# Patient Record
Sex: FEMALE | Race: BLACK OR AFRICAN AMERICAN | NOT HISPANIC OR LATINO | Employment: FULL TIME | URBAN - METROPOLITAN AREA
[De-identification: names, ages, dates, MRNs, and addresses within clinical notes are randomized per-mention and may not be internally consistent; named-entity substitution may affect disease eponyms.]

---

## 2017-09-28 ENCOUNTER — OFFICE VISIT (OUTPATIENT)
Dept: ORTHOPEDIC SURGERY | Facility: CLINIC | Age: 52
End: 2017-09-28

## 2017-09-28 VITALS — WEIGHT: 220.4 LBS | BODY MASS INDEX: 35.42 KG/M2 | HEIGHT: 66 IN | TEMPERATURE: 97.6 F

## 2017-09-28 DIAGNOSIS — M25.561 CHRONIC PAIN OF BOTH KNEES: ICD-10-CM

## 2017-09-28 DIAGNOSIS — M17.12 PRIMARY OSTEOARTHRITIS OF LEFT KNEE: ICD-10-CM

## 2017-09-28 DIAGNOSIS — M25.562 CHRONIC PAIN OF BOTH KNEES: ICD-10-CM

## 2017-09-28 DIAGNOSIS — M17.11 PRIMARY OSTEOARTHRITIS OF RIGHT KNEE: ICD-10-CM

## 2017-09-28 DIAGNOSIS — G89.29 CHRONIC PAIN OF BOTH KNEES: ICD-10-CM

## 2017-09-28 DIAGNOSIS — M25.562 CHRONIC PAIN OF LEFT KNEE: Primary | ICD-10-CM

## 2017-09-28 DIAGNOSIS — G89.29 CHRONIC PAIN OF LEFT KNEE: Primary | ICD-10-CM

## 2017-09-28 PROCEDURE — 99214 OFFICE O/P EST MOD 30 MIN: CPT | Performed by: ORTHOPAEDIC SURGERY

## 2017-09-28 PROCEDURE — 73564 X-RAY EXAM KNEE 4 OR MORE: CPT | Performed by: ORTHOPAEDIC SURGERY

## 2017-09-28 RX ORDER — TIOTROPIUM BROMIDE INHALATION SPRAY 1.56 UG/1
SPRAY, METERED RESPIRATORY (INHALATION)
COMMUNITY
Start: 2017-07-12

## 2017-09-28 RX ORDER — CARVEDILOL 6.25 MG/1
TABLET ORAL
COMMUNITY
Start: 2017-08-28

## 2017-09-28 RX ORDER — TRAMADOL HYDROCHLORIDE 50 MG/1
TABLET ORAL
COMMUNITY
Start: 2017-08-30 | End: 2020-05-26

## 2017-09-28 RX ORDER — WARFARIN SODIUM 2.5 MG/1
TABLET ORAL
COMMUNITY
Start: 2017-06-26 | End: 2020-11-20

## 2017-09-28 RX ORDER — ERGOCALCIFEROL 1.25 MG/1
CAPSULE ORAL
COMMUNITY
Start: 2017-07-10

## 2017-09-28 NOTE — PROGRESS NOTES
Patient:  Morena Contreras is a 51 y.o. female    Chief Complaint/ Reason for Visit:    Chief Complaint   Patient presents with   • Left Knee - Establish Care, Pain   • Right Knee - Establish Care, Pain       HPI:  The patient presents today complaining primarily of left knee pain.  The pain is aching in character, occasionally sharp and stabbing usually mild but occasionally moderate in intensity.  She'll occasionally have a burning sensation on the anterior aspect of the left knee.  She also has episodic mild anterior pain in her right knee to a lesser degree.  She has not had a recent injury, but she does say that she slipped and fell back in May and may have injured the right knee then.  Standing and walking exacerbate her knee pain.  Rest and elevation help.  She has occasionally taken anti-inflammatories such as Aleve, Advil, aspirin, or meloxicam.  Lately, however, she's been on anticoagulants due to her heart surgery and has been instructed to avoid anti-inflammatories accordingly.    The patient has had a cortisone shot in her right knee that I performed in July 2016.  That did give her long-lasting relief.    As noted above, she has had other things going on in her medical status of more importance.  She had open-heart surgery to repair 2 of her heart valves back on June 13.  She is still recovering from that.      PMH:    Past Medical History:   Diagnosis Date   • Anemia    • Asthma    • Cardiac disease     valve lean       PSH:    Past Surgical History:   Procedure Laterality Date   • CARDIAC SURGERY  06/2017   • TUBAL ABDOMINAL LIGATION         Social Hx:    Social History     Social History   • Marital status: Other     Spouse name: N/A   • Number of children: N/A   • Years of education: N/A     Occupational History   • Not on file.     Social History Main Topics   • Smoking status: Never Smoker   • Smokeless tobacco: Never Used   • Alcohol use No   • Drug use: No   • Sexual activity: Defer     Other  Topics Concern   • Not on file     Social History Narrative       Family Hx:    Family History   Problem Relation Age of Onset   • Heart disease Other      valve leak       Meds:    Current Outpatient Prescriptions:   •  carvedilol (COREG) 6.25 MG tablet, , Disp: , Rfl:   •  FLOVENT DISKUS 250 MCG/BLIST aerosol powder , , Disp: , Rfl:   •  SPIRIVA RESPIMAT 1.25 MCG/ACT aerosol solution inhaler, , Disp: , Rfl:   •  traMADol (ULTRAM) 50 MG tablet, , Disp: , Rfl:   •  budesonide-formoterol (SYMBICORT) 160-4.5 MCG/ACT inhaler, Inhale 2 puffs 2 (two) times a day., Disp: , Rfl:   •  meloxicam (MOBIC) 15 MG tablet, , Disp: , Rfl:   •  montelukast (SINGULAIR) 10 MG tablet, Take 10 mg by mouth every night., Disp: , Rfl:   •  Pantoprazole Sodium (PROTONIX PO), Take 40 mg by mouth., Disp: , Rfl:   •  VIBERZI 75 MG tablet, , Disp: , Rfl:   •  vitamin D (ERGOCALCIFEROL) 21168 units capsule capsule, , Disp: , Rfl:   •  warfarin (COUMADIN) 2.5 MG tablet, , Disp: , Rfl:   •  WELCHOL 625 MG tablet, , Disp: , Rfl:     Allergies:    Allergies   Allergen Reactions   • Procaine      Seizure like activity       ROS:  Review of Systems   Constitutional: Positive for unexpected weight change. Negative for chills and fever.   HENT: Positive for postnasal drip. Negative for trouble swallowing and voice change.    Eyes: Positive for visual disturbance. Eye itching: dry.   Respiratory: Positive for cough and wheezing. Negative for shortness of breath.    Cardiovascular: Negative for chest pain and leg swelling.   Gastrointestinal: Positive for diarrhea. Negative for abdominal pain, nausea and vomiting.   Endocrine: Negative for cold intolerance and heat intolerance.   Genitourinary: Negative for difficulty urinating, frequency and urgency.   Musculoskeletal: Positive for joint swelling.   Skin: Negative for rash and wound.   Allergic/Immunologic: Negative for immunocompromised state.   Neurological: Positive for headaches. Negative for  "weakness and numbness.   Hematological: Does not bruise/bleed easily.   Psychiatric/Behavioral: Positive for sleep disturbance. Negative for dysphoric mood. The patient is not nervous/anxious.        Vitals:    09/28/17 1151   Temp: 97.6 °F (36.4 °C)   TempSrc: Temporal Artery    Weight: 220 lb 6.4 oz (100 kg)   Height: 66\" (167.6 cm)   Body mass index is 35.57 kg/(m^2).      Physical Exam    The patient is awake, alert, and oriented ×3.  The patient is in no acute distress.  Breathing is regular and unlabored with a respiratory rate of 12/m.  Extraocular movements and pupillary responses are symmetrically intact. Sclerae are anicteric.   Hearing is within normal limits.  Speech is within normal limits.  There is no jugular venous distention.    On standing exam, the patient's lower extremities are fairly symmetrical in appearance.  There is no muscular atrophy.  The patient is fairly heavyset and this can be seen well down into both lower extremities.  She has a lumbering gait with no pronounced limp.    Left knee: There is no abnormal warmth, bruising, or discoloration.  Range of motion is 0-120° of flexion.  She does have mild crepitus on patella grind.  The left knee feels stable at this time.  The patient has some anterolateral tenderness along the patellofemoral articulation.  Apprehension test is negative.  The left knee feels stable in all planes.  Bounce test is negative.  Kyleigh test is negative.  Hyperflexion test is positive.    Right knee: There is no abnormal warmth, bruising, or discoloration.  Range of motion is 0-125° of flexion.  The patient has moderate crepitus on patella grind.  The right knee feels stable at this time.  The patient has no tenderness though she does have pain on patella grind.  Apprehension test is negative.  Bounce test is negative.  Kyleigh test is negative.        Radiology:X-rays: A 4 view arthritis series of each knee was ordered and reviewed today to assess patient's " complaints of bilateral knee pain.  I did review these personally and I did compare them to images we had here in the office dating as far back as 2012.  Today's images reveal the patient has moderate tricompartmental degenerative change in the right knee in the form of tricompartmental osteophyte formation.  Joint line narrowing.  The patient has what appears to be a stable loose body in the posterior aspect of the right knee.  Spurring on the patella is noted.  In comparison to the previous images, the degenerative changes and spurring in the patellofemoral compartment of the right knee in particular have worsened and the loose body in the back has enlarged.  The left knee images today reveal mild to moderate degenerative change and is most notable in the patellofemoral compartment.  These changes have not worsened substantially since the previous images we had here in the office for comparison.  I see no evidence of acute fracture on any the images today.          Assessment:  1. Chronic pain of left knee    - Ambulatory Referral to Physical Therapy Evaluate and treat, Ortho    2. Chronic pain of both knees    - XR Knee 4+ View Bilateral  - Ambulatory Referral to Physical Therapy Evaluate and treat, Ortho    3. Primary osteoarthritis of right knee    - Ambulatory Referral to Physical Therapy Evaluate and treat, Ortho    4. Primary osteoarthritis of left knee    - Ambulatory Referral to Physical Therapy Evaluate and treat, Ortho          Plan:  I discussed everything with the patient length.  Due to the shape and adiposity of her legs, I do not think bracing is likely to be very useful.  I think physical therapy is important.  I think that the acute pain in the left knee may be benefited by a topical anti-inflammatory/analgesic preparation.    Prescription medication management: I did provide the patient with instructions regarding the use of and a prescription for the topical transcutaneous anti-inflammatory  analgesic preparation.  Appropriate precautions were discussed as well.  The patient voiced understanding.    Physical therapy was prescribed.    We discussed injections, but she does not feel that that is warranted, and I feel that it is contraindicated given the fact that she is currently on anticoagulants.      Orders Placed This Encounter   Procedures   • XR Knee 4+ View Bilateral     Order Specific Question:   Reason for Exam:     Answer:   óscar knee pain     Order Specific Question:   Patient Pregnant     Answer:   No   • Ambulatory Referral to Physical Therapy Evaluate and treat, Ortho     Referral Priority:   Routine     Referral Type:   Therapy     Referral Reason:   Specialty Services Required     Requested Specialty:   Physical Therapy     Number of Visits Requested:   1

## 2017-10-11 ENCOUNTER — TREATMENT (OUTPATIENT)
Dept: PHYSICAL THERAPY | Facility: CLINIC | Age: 52
End: 2017-10-11

## 2017-10-11 DIAGNOSIS — G89.29 CHRONIC PAIN OF BOTH KNEES: ICD-10-CM

## 2017-10-11 DIAGNOSIS — M25.562 CHRONIC PAIN OF BOTH KNEES: ICD-10-CM

## 2017-10-11 DIAGNOSIS — M17.0 PRIMARY OSTEOARTHRITIS OF BOTH KNEES: Primary | ICD-10-CM

## 2017-10-11 DIAGNOSIS — M25.561 CHRONIC PAIN OF BOTH KNEES: ICD-10-CM

## 2017-10-11 PROCEDURE — 97161 PT EVAL LOW COMPLEX 20 MIN: CPT | Performed by: PHYSICAL THERAPIST

## 2017-10-11 PROCEDURE — 97530 THERAPEUTIC ACTIVITIES: CPT | Performed by: PHYSICAL THERAPIST

## 2017-10-11 PROCEDURE — 97110 THERAPEUTIC EXERCISES: CPT | Performed by: PHYSICAL THERAPIST

## 2017-10-11 NOTE — PROGRESS NOTES
"Physical Therapy Initial Evaluation and Plan of Care    TIME IN 1508 TIME OUT 1616  Patient: Morena Contreras   : 1965  Diagnosis/ICD-10 Code:  Primary osteoarthritis of both knees [M17.0]  Referring practitioner: Charles Elmore MD    Subjective Evaluation    History of Present Illness  Onset date: 6-7+ years.  Mechanism of injury: (R) knee \"pops all the time when I stand up now.\" Both knees painful on/off but (L) knee more painful.  Reports \"tore cartilage\" (R) knee getting up off couch but did not have surgery. \"Air seat\" bounces and irritates knees.      Had some PT for (B) knees 5-6 years ago.      PMH: Open heart surgery for valve leak repair 17.  Was doing cardiac rehab in  2017 and developed a large (L) LE hematoma from taking blood thinners.  Knee pain worsened after this so patient presented to Dr. Elmore for assessment who recommended PT for (B) knee OA.   Sees Dr. Timo Chatman for (B) foot tendonitis.      Per patient, (R) knee OA is more advanced than (L) knee but (L) knee pain is worse.          Patient Occupation:  - [employer] [facility]  Current pain ratin  At best pain ratin  Pain scale at highest: (L) knee: 9/10; (R) knee: 3/10 (intermittent)  Location: (B) knees at anterior and lateral knee along joint line  Quality: dull ache  Relieving factors: heat, relaxation and rest (warm towels)  Progression: worsening    Social Support  Lives in: multiple-level home (3 steps inside house and 12 basement steps)  Lives with: spouse    Hand dominance: right    Diagnostic Tests  X-ray: abnormal (mild to moderate osteoarthritis (B) knees)    Treatments  Previous treatment: physical therapy and injection treatment (most recent injection (L) knee approx. 3 yrs ago)  Current treatment: physical therapy  Patient Goals  Patient goals for therapy: decreased pain and increased strength  Patient goal: \"Help with pain and strengthen my legs\"           Objective       Tenderness "     Additional Tenderness Details  Min (+) TTP (B) anteromedial tibial plateau, pes anserine insertion    Active Range of Motion   Left Knee   Flexion: 123 degrees   Extension: 0 degrees   Extensor la degrees     Right Knee   Flexion: 126 degrees   Extension: 0 degrees   Extensor la degrees     Strength/Myotome Testing     Left Hip   Planes of Motion   Flexion: 4  Abduction: 3+  Adduction: 4-    Right Hip   Planes of Motion   Flexion: 4  Abduction: 3+  Adduction: 4-    Left Knee   Flexion: 4+  Extension: 4+  Quadriceps contraction: fair    Right Knee   Flexion: 4+  Extension: 4+  Quadriceps contraction: fair    Left Ankle/Foot   Dorsiflexion: 4+    Right Ankle/Foot   Dorsiflexion: 4+    Swelling     Left Knee Girth Measurement (cm)   Joint line: 49.5 cm.    Right Knee Girth Measurement (cm)   Joint line: 49.7 cm.         Assessment & Plan     Assessment  Impairments: abnormal gait, activity intolerance, impaired balance, impaired physical strength, lacks appropriate home exercise program and pain with function  Other impairment: limited ADLs, impaired functional mobility, and decreased QOL  Assessment details: STGs: to be met in 3 weeks  1. Patient will be independent with initial HEP  2. Patient will report improved (L) knee s/s 0-4/10 and (R) knee s/s 0-2/10  3. Patient will demonstrate (B) knee AROM WFL, painfree  4. Patient will be nontender to palpation (B) knees  5. Patient will tolerate sitting x 2 hours and standing/walking x 45 minutes     LTGs: to be met in 6 weeks  1. Patient will be independent with progressed strength and balance HEP  2. Patient will report improved (B) knee s/s 0-1/10  3. Patient will demonstrate improved (B) LE strength >/= 5-/5, grossly  4. Patient will perform 10 minutes of static and dynamic balance activities without significant LOB episode  5. Patient will report 75% improved community distance functional mobility  6. Patient will exhibit improved LEFS score >/=  50/80  Prognosis: good  Functional Limitations: walking, uncomfortable because of pain, sitting, standing and stooping  Plan  Therapy options: will be seen for skilled physical therapy services  Planned modality interventions: cryotherapy, electrical stimulation/Russian stimulation, thermotherapy (hydrocollator packs) and ultrasound  Planned therapy interventions: balance/weight-bearing training, flexibility, functional ROM exercises, gait training, home exercise program, manual therapy, joint mobilization, neuromuscular re-education, soft tissue mobilization, strengthening, stretching and therapeutic activities  Frequency: 2-3 x wk.  Duration in weeks: 6  Treatment plan discussed with: patient        Manual Therapy:         mins  29255;  Therapeutic Exercise:    25     mins  11838;     Neuromuscular Laine:        mins  12105;    Therapeutic Activity:     16     mins  02781;     Gait Training:           mins  36170;     Ultrasound:          mins  07506;    Electrical Stimulation:         mins  01571 ( );  Dry Needling          mins self-pay    Timed Treatment:   41   mins   Total Treatment:     66   mins    PT SIGNATURE: Jasmin Ladd PT, DPT   DATE TREATMENT INITIATED: 10/11/2017    Initial Certification  Certification Period: 1/9/2018  I certify that the therapy services are furnished while this patient is under my care.  The services outlined above are required by this patient, and will be reviewed every 90 days.     PHYSICIAN: Charles Elmore MD      DATE:     Please sign and return via fax to [phone].. Thank you, [facility].

## 2017-10-17 ENCOUNTER — TREATMENT (OUTPATIENT)
Dept: PHYSICAL THERAPY | Facility: CLINIC | Age: 52
End: 2017-10-17

## 2017-10-17 DIAGNOSIS — M25.561 CHRONIC PAIN OF BOTH KNEES: ICD-10-CM

## 2017-10-17 DIAGNOSIS — G89.29 CHRONIC PAIN OF BOTH KNEES: ICD-10-CM

## 2017-10-17 DIAGNOSIS — M17.0 PRIMARY OSTEOARTHRITIS OF BOTH KNEES: Primary | ICD-10-CM

## 2017-10-17 DIAGNOSIS — M25.562 CHRONIC PAIN OF BOTH KNEES: ICD-10-CM

## 2017-10-17 PROCEDURE — 97530 THERAPEUTIC ACTIVITIES: CPT | Performed by: PHYSICAL THERAPIST

## 2017-10-17 PROCEDURE — 97110 THERAPEUTIC EXERCISES: CPT | Performed by: PHYSICAL THERAPIST

## 2017-10-17 NOTE — PROGRESS NOTES
"Physical Therapy Daily Progress Note    Time In 1054  Time Out 1136    Morena Contreras reports: \"I'm doing alright I guess. My knees are about the same.  My ankles started to bother me a bit at [employer] the other day so I don't know what's going on. I had trouble getting up into the back of my SUV the other day because my legs are so weak.\"    Subjective     Objective   See Exercise, Manual, and Modality Logs for complete treatment.   *Initiated LE bike  *Added standing weightshifts, heel raises, SAQ and hamstring curls  *Increased most LE strengthening repetitions to 12    Assessment & Plan     Assessment  Assessment details: Patient tolerates all exercise and activity progressions well this date.  She requires tactile cueing to more fully activate and contract quadriceps muscle (L) > (R) LE with fair return.  She demonstrates apparent moderate difficulty performing transitions, especially sit to stand from low surface.  Issued printed HEP with exercise additions.  All questions are answered to patient's satisfaction.          Progress strengthening /stabilization /functional activity. Progress to green hamstring curls and consider addition of step activity.         Manual Therapy:         mins  05033;  Therapeutic Exercise:    28     mins  53403;     Neuromuscular Laine:        mins  85150;    Therapeutic Activity:     14     mins  81226;     Gait Training:           mins  00761;     Ultrasound:          mins  62902;    Electrical Stimulation:         mins  63708 ( );  Dry Needling          mins self-pay    Timed Treatment:   42   mins   Total Treatment:     42   mins    Jasmin Ladd PT, DPT  Physical Therapist  [place] License # [id number]    "

## 2017-10-19 ENCOUNTER — TREATMENT (OUTPATIENT)
Dept: PHYSICAL THERAPY | Facility: CLINIC | Age: 52
End: 2017-10-19

## 2017-10-19 DIAGNOSIS — M25.561 CHRONIC PAIN OF BOTH KNEES: ICD-10-CM

## 2017-10-19 DIAGNOSIS — G89.29 CHRONIC PAIN OF BOTH KNEES: ICD-10-CM

## 2017-10-19 DIAGNOSIS — M25.562 CHRONIC PAIN OF BOTH KNEES: ICD-10-CM

## 2017-10-19 DIAGNOSIS — M17.0 PRIMARY OSTEOARTHRITIS OF BOTH KNEES: Primary | ICD-10-CM

## 2017-10-19 PROCEDURE — 97110 THERAPEUTIC EXERCISES: CPT | Performed by: PHYSICAL THERAPIST

## 2017-10-19 PROCEDURE — 97530 THERAPEUTIC ACTIVITIES: CPT | Performed by: PHYSICAL THERAPIST

## 2017-10-19 NOTE — PROGRESS NOTES
"Physical Therapy Daily Progress Note    Time In 1104  Time Out 1151    Morena Contreras reports: \"My knees have been feeling pretty good.  I've been trying to make an effort to do my exercises a second time during the day.\"    Subjective     Objective   See Exercise, Manual, and Modality Logs for complete treatment.   *Added 4\" step activity (FW and LAT)  *Increased repetitions of most LE strengthening activities    Assessment & Plan     Assessment  Assessment details: Patient is able to progress LE bike time, repetitions of LE strengthening activities, and initiate 4\" step activity this date with c/o only minor increase in muscular fatigue.  She is motivated with all activity and strength progressions.  She exhibits an increased valgus (R) > (L) LE during stance phase of gait and most any weightbearing activity (i.e. Steps) and would benefit to progress hip strength and stabilization activities to effect decrease knee joint stresses.        Progress strengthening /stabilization /functional activity         Manual Therapy:         mins  91008;  Therapeutic Exercise:    28     mins  38836;     Neuromuscular Laine:        mins  86336;    Therapeutic Activity:     13     mins  98432;     Gait Training:           mins  95891;     Ultrasound:          mins  31168;    Electrical Stimulation:         mins  87758 ( );  Dry Needling          mins self-pay    Timed Treatment:   41   mins   Total Treatment:     47   mins    Jasmin Ladd PT, DPT  Physical Therapist  [place] License # [id number]    "

## 2017-10-24 ENCOUNTER — TREATMENT (OUTPATIENT)
Dept: PHYSICAL THERAPY | Facility: CLINIC | Age: 52
End: 2017-10-24

## 2017-10-24 DIAGNOSIS — M17.0 PRIMARY OSTEOARTHRITIS OF BOTH KNEES: Primary | ICD-10-CM

## 2017-10-24 DIAGNOSIS — G89.29 CHRONIC PAIN OF BOTH KNEES: ICD-10-CM

## 2017-10-24 DIAGNOSIS — M25.561 CHRONIC PAIN OF BOTH KNEES: ICD-10-CM

## 2017-10-24 DIAGNOSIS — M25.562 CHRONIC PAIN OF BOTH KNEES: ICD-10-CM

## 2017-10-24 PROCEDURE — 97110 THERAPEUTIC EXERCISES: CPT | Performed by: PHYSICAL THERAPIST

## 2017-10-24 PROCEDURE — 97530 THERAPEUTIC ACTIVITIES: CPT | Performed by: PHYSICAL THERAPIST

## 2017-10-24 NOTE — PROGRESS NOTES
"Physical Therapy Daily Progress Note    Time In 1102  Time Out 1155    Morena Contreras reports: \"My knees have been feeling pretty good.  My left foot has really been bothering me. I start my cardiac rehab tomorrow afternoon; I had the evaluation yesterday.\"    Subjective     Objective   See Exercise, Manual, and Modality Logs for complete treatment.   *Added SLR with quad set (B)    Assessment & Plan     Assessment  Assessment details: Patient reports slightly improving s/s (B) knees.  She ambulates with moderately increased valgus moment (B) LEs during stance and increased lateral trunk sway.  Two to three times during PT session, patient becomes apparently slightly winded and requires brief rest break for recovery before proceeding.  She struggles with initiation of SLR (R) > (L) LE, especially during eccentric component.        Progress strengthening /stabilization /functional activity. Reassess (B) knee AROM and strength.  Consider addition of resisted TKE.         Manual Therapy:         mins  45802;  Therapeutic Exercise:    39     mins  09364;     Neuromuscular Laine:        mins  19557;    Therapeutic Activity:     14     mins  70961;     Gait Training:           mins  57175;     Ultrasound:          mins  37056;    Electrical Stimulation:         mins  14715 ( );  Dry Needling          mins self-pay    Timed Treatment:   53   mins   Total Treatment:     53   mins    Jasmin Ladd PT, DPT  Physical Therapist  [place] License # [id number]    "

## 2017-10-31 ENCOUNTER — TREATMENT (OUTPATIENT)
Dept: PHYSICAL THERAPY | Facility: CLINIC | Age: 52
End: 2017-10-31

## 2017-10-31 DIAGNOSIS — M17.0 PRIMARY OSTEOARTHRITIS OF BOTH KNEES: Primary | ICD-10-CM

## 2017-10-31 DIAGNOSIS — M25.561 CHRONIC PAIN OF BOTH KNEES: ICD-10-CM

## 2017-10-31 DIAGNOSIS — M25.562 CHRONIC PAIN OF BOTH KNEES: ICD-10-CM

## 2017-10-31 DIAGNOSIS — G89.29 CHRONIC PAIN OF BOTH KNEES: ICD-10-CM

## 2017-10-31 PROCEDURE — 97530 THERAPEUTIC ACTIVITIES: CPT | Performed by: PHYSICAL THERAPIST

## 2017-10-31 PROCEDURE — 97110 THERAPEUTIC EXERCISES: CPT | Performed by: PHYSICAL THERAPIST

## 2017-10-31 NOTE — PROGRESS NOTES
"Physical Therapy Daily Progress Note    Time In 1059  Time Out 1152    Morena Contreras reports: \"I started my cardiac rehab and have been doing ok with it.  It wears me out a little bit until I get used to it again.  My left knee has been feeling ok but the right one is the one that gives me problems because of that bone spur and it wants to catch and give out on me at times. I feel that both of my legs are getting stronger though. I am trying to change my mindset where I actually start using my right legs to go up steps first sometimes but it's hard to do.\"    Subjective     Objective       Active Range of Motion   Left Knee   Flexion: 131 degrees   Extension: 0 degrees   Extensor la degrees     Right Knee   Flexion: 130 degrees   Extension: 0 degrees   Extensor la degrees     Strength/Myotome Testing     Left Knee   Left knee flexion strength: 5-/5.  Left knee extension strength: 5-/5.  Quadriceps contraction: good    Right Knee   Right knee flexion strength: 5-/5.  Right knee extension strength: 5-/5.  Quadriceps contraction: good     See Exercise, Manual, and Modality Logs for complete treatment.   *Increased repetitions with step-up activity and resistance with hip add isometrics  *Initiated bridging    Assessment & Plan     Assessment  Assessment details: Patient demonstrates improved (B) knee flexion AROM and knee strength.  She reports improved feeling of strength in (B) LEs but persistent deficits in (R) > (L) knee, especially regarding stair negotiation.  She demonstrates significant proximal > distal LE weakness, most evident in performance of SLR and addition of bridging this date. Moderate antalgia and compensation is evident with short distance community ambulation, (R) > (L) LE. She remains motivated with PT activities and progressions.        Progress strengthening /stabilization /functional activity         Manual Therapy:         mins  59475;  Therapeutic Exercise:    32     mins  17992;   "   Neuromuscular Laine:        mins  49091;    Therapeutic Activity:     14     mins  07278;     Gait Training:           mins  63315;     Ultrasound:          mins  30922;    Electrical Stimulation:         mins  02690 ( );  Dry Needling          mins self-pay    Timed Treatment:   46  mins   Total Treatment:     53   mins    Jasmin Ladd PT, DPT  Physical Therapist  [place] License # [id number]

## 2017-11-02 ENCOUNTER — TREATMENT (OUTPATIENT)
Dept: PHYSICAL THERAPY | Facility: CLINIC | Age: 52
End: 2017-11-02

## 2017-11-02 DIAGNOSIS — M17.0 PRIMARY OSTEOARTHRITIS OF BOTH KNEES: Primary | ICD-10-CM

## 2017-11-02 DIAGNOSIS — M25.561 CHRONIC PAIN OF BOTH KNEES: ICD-10-CM

## 2017-11-02 DIAGNOSIS — M25.562 CHRONIC PAIN OF BOTH KNEES: ICD-10-CM

## 2017-11-02 DIAGNOSIS — G89.29 CHRONIC PAIN OF BOTH KNEES: ICD-10-CM

## 2017-11-02 PROCEDURE — 97110 THERAPEUTIC EXERCISES: CPT | Performed by: PHYSICAL THERAPIST

## 2017-11-02 PROCEDURE — 97530 THERAPEUTIC ACTIVITIES: CPT | Performed by: PHYSICAL THERAPIST

## 2017-11-02 NOTE — PROGRESS NOTES
"Physical Therapy Daily Progress Note    Time In 1051  Time Out 1139    Morena Contreras reports: \"I'm dragging today.  I'm really tired and I don't know why. My knees feel alright but my left ankle and foot are killing me.  I think it's about time to schedule an appointment to get that looked into further.\"    Subjective     Objective   See Exercise, Manual, and Modality Logs for complete treatment.   *HELD standing heel raises this date due to (L) foot pain    Assessment & Plan     Assessment  Assessment details: Patient performs all activities and exercises with fair to good endurance and technique with minimal cueing to improve effectiveness.  She reports limiting (L) foot and ankle pain which seems to be worsening and affects her ability to perform standing heel raises this date as well as pain during performance of LE bike.  Encouraged patient's decision to pursue MD evaluation of her foot due to persistent and worsening s/s.        Progress strengthening /stabilization /functional activity. Consider addition of bridging for proximal/hip strengthening.         Manual Therapy:         mins  19650;  Therapeutic Exercise:    36     mins  52022;     Neuromuscular Laine:        mins  21864;    Therapeutic Activity:     12     mins  26626;     Gait Training:           mins  85943;     Ultrasound:          mins  84648;    Electrical Stimulation:         mins  76559 ( );  Dry Needling          mins self-pay    Timed Treatment:   48   mins   Total Treatment:     48   mins    Jasmin Ladd PT, DPT  Physical Therapist  [place] License # [id number]    "

## 2017-11-07 ENCOUNTER — TREATMENT (OUTPATIENT)
Dept: PHYSICAL THERAPY | Facility: CLINIC | Age: 52
End: 2017-11-07

## 2017-11-07 DIAGNOSIS — M17.0 PRIMARY OSTEOARTHRITIS OF BOTH KNEES: Primary | ICD-10-CM

## 2017-11-07 DIAGNOSIS — G89.29 CHRONIC PAIN OF BOTH KNEES: ICD-10-CM

## 2017-11-07 DIAGNOSIS — M25.562 CHRONIC PAIN OF BOTH KNEES: ICD-10-CM

## 2017-11-07 DIAGNOSIS — M25.561 CHRONIC PAIN OF BOTH KNEES: ICD-10-CM

## 2017-11-07 PROCEDURE — 97530 THERAPEUTIC ACTIVITIES: CPT | Performed by: PHYSICAL THERAPIST

## 2017-11-07 PROCEDURE — 97110 THERAPEUTIC EXERCISES: CPT | Performed by: PHYSICAL THERAPIST

## 2017-11-07 NOTE — PROGRESS NOTES
"Physical Therapy Daily Progress Note    Time In 1428  Time Out 1518    Morena Contreras reports: \"My knees have been doing really good.  I still haven't called the doctor about my foot [[facility]].\"    Subjective     Objective   See Exercise, Manual, and Modality Logs for complete treatment.   *Progressed to 6\" step    Assessment & Plan     Assessment  Assessment details: Patient is able to progress step height this date with minor c/o increased fatigue but no significant discomfort or compensatory pattern.  Overall she demonstrates much improved stability with short distance community ambulation including decreased compensatory lateral trunk sway and antalgia either LE.  She performs transitional movements including sit to stands with increased speed and apparent improved ease and comfort.        Progress strengthening /stabilization /functional activity         Manual Therapy:         mins  79698;  Therapeutic Exercise:    21     mins  72138;     Neuromuscular Laine:        mins  21096;    Therapeutic Activity:     14     mins  29935;     Gait Training:           mins  75403;     Ultrasound:          mins  61402;    Electrical Stimulation:         mins  89415 ( );  Dry Needling          mins self-pay    Timed Treatment:   35   mins   Total Treatment:     50   mins    Jasmin Ladd PT, DPT  Physical Therapist  [place] License # [id number]    "

## 2017-11-14 ENCOUNTER — OFFICE VISIT (OUTPATIENT)
Dept: PHYSICAL THERAPY | Facility: CLINIC | Age: 52
End: 2017-11-14

## 2017-11-14 DIAGNOSIS — M25.561 CHRONIC PAIN OF BOTH KNEES: ICD-10-CM

## 2017-11-14 DIAGNOSIS — M25.562 CHRONIC PAIN OF BOTH KNEES: ICD-10-CM

## 2017-11-14 DIAGNOSIS — M17.0 PRIMARY OSTEOARTHRITIS OF BOTH KNEES: Primary | ICD-10-CM

## 2017-11-14 DIAGNOSIS — G89.29 CHRONIC PAIN OF BOTH KNEES: ICD-10-CM

## 2017-11-14 PROCEDURE — 97530 THERAPEUTIC ACTIVITIES: CPT | Performed by: PHYSICAL THERAPIST

## 2017-11-14 PROCEDURE — 97110 THERAPEUTIC EXERCISES: CPT | Performed by: PHYSICAL THERAPIST

## 2017-11-15 NOTE — PROGRESS NOTES
Physical Therapy Daily Progress Note    Time In 1100  Time Out 1200    Morena Contreras reports  Knees still giving me some trouble.  A little harder to do exercises today as doctor says I may be retaining fluid around my heart.  He has started me back on a water pill.    Subjective     Objective   See Exercise, Manual, and Modality Logs for complete treatment.   Red band for resistive hip flexion marches.   Increased TKE to green band.      Assessment/Plan  .Compliant/Cooperative with rehab efforts.  Subjectively reports no increased symptoms or discomfort with therapeutic exercise today, though does require some extra breaks between exercises due to fatigue.  Able to perform increased exercise/functional activity with addition of resistive band to marches and increased resistive band color with TKE without increased knee pain.  Benefits from verbal/tactile cues to ensure proper exercise technique/performance in regards to reps/hold time.    Progress strengthening /stabilization /functional activity as able for affected musculature.           Manual Therapy:         mins  07256;  Therapeutic Exercise:    30     mins  94436;     Neuromuscular Laine:       mins  61213;    Therapeutic Activity:    15     mins  06402;     Gait Training:           mins  78831;     Ultrasound:         mins  18631;    Electrical Stimulation:      mins  45327 ( );      Timed Treatment:   45   mins   Total Treatment:     60   mins    Prabhjot Yu PTA    Physical Therapist Assistant [place] [id number]

## 2017-11-16 ENCOUNTER — TREATMENT (OUTPATIENT)
Dept: PHYSICAL THERAPY | Facility: CLINIC | Age: 52
End: 2017-11-16

## 2017-11-16 DIAGNOSIS — G89.29 CHRONIC PAIN OF BOTH KNEES: ICD-10-CM

## 2017-11-16 DIAGNOSIS — M17.0 PRIMARY OSTEOARTHRITIS OF BOTH KNEES: Primary | ICD-10-CM

## 2017-11-16 DIAGNOSIS — M25.561 CHRONIC PAIN OF BOTH KNEES: ICD-10-CM

## 2017-11-16 DIAGNOSIS — M25.562 CHRONIC PAIN OF BOTH KNEES: ICD-10-CM

## 2017-11-16 PROCEDURE — 97530 THERAPEUTIC ACTIVITIES: CPT | Performed by: PHYSICAL THERAPIST

## 2017-11-16 PROCEDURE — 97110 THERAPEUTIC EXERCISES: CPT | Performed by: PHYSICAL THERAPIST

## 2017-11-16 NOTE — PROGRESS NOTES
"Physical Therapy Daily Progress Note    Time In 1058  Time Out 1202    Morena Contreras reports: \"They put me back on a water pill to get all of this fluid off of me.  It's helping a lot but I'm running to the bathroom often.  I am getting better though. My knees are feeling good. I go to see the foot doctor tomorrow about my left foot.\"    Subjective     Objective   See Exercise, Manual, and Modality Logs for complete treatment.       Assessment & Plan     Assessment  Assessment details: Patient is limited in weightbearing activity progression at this time by (L) foot pain which she is addressing by going to foot specialist tomorrow.  Otherwise, she is able to gradually and steadily progress most LE strengthening activities with c/o only fatigue.  Discussed with patient her readiness to transition to once weekly appointments x 2 weeks as first step in facilitating increased independence and eventual readiness for discharge.  Patient is in agreement with this decision.        Other - decrease frequency to once weekly.         Manual Therapy:         mins  58156;  Therapeutic Exercise:    19     mins  77533;     Neuromuscular Laine:        mins  91438;    Therapeutic Activity:     14     mins  01855;     Gait Training:           mins  33311;     Ultrasound:          mins  45104;    Electrical Stimulation:         mins  70853 ( );  Dry Needling          mins self-pay    Timed Treatment:   33   mins   Total Treatment:     64   mins    Jasmin Ladd PT, DPT  Physical Therapist  [place] License # [id number]    "

## 2017-11-21 ENCOUNTER — TREATMENT (OUTPATIENT)
Dept: PHYSICAL THERAPY | Facility: CLINIC | Age: 52
End: 2017-11-21

## 2017-11-21 DIAGNOSIS — M17.0 PRIMARY OSTEOARTHRITIS OF BOTH KNEES: Primary | ICD-10-CM

## 2017-11-21 DIAGNOSIS — M25.562 CHRONIC PAIN OF BOTH KNEES: ICD-10-CM

## 2017-11-21 DIAGNOSIS — M25.561 CHRONIC PAIN OF BOTH KNEES: ICD-10-CM

## 2017-11-21 DIAGNOSIS — G89.29 CHRONIC PAIN OF BOTH KNEES: ICD-10-CM

## 2017-11-21 PROCEDURE — 97530 THERAPEUTIC ACTIVITIES: CPT | Performed by: PHYSICAL THERAPIST

## 2017-11-21 PROCEDURE — 97110 THERAPEUTIC EXERCISES: CPT | Performed by: PHYSICAL THERAPIST

## 2017-11-21 NOTE — PROGRESS NOTES
"Physical Therapy Daily Progress Note    Time In 1101  Time Out 1149    Morena Contreras reports: \"My knees have been doing well.  My main problem now is this left foot.  I saw the foot doctor last week and she said there is not a lot she can do since I am on blood thinners but that it is probably tendonitis and she gave me an order to start PT on it.\"    Subjective     Objective   See Exercise, Manual, and Modality Logs for complete treatment.   *Defer step activities this date due to (L) foot pain    Assessment & Plan     Assessment  Assessment details: Patient continues to report minimal (B) knee c/o and feeling of improved strength which has translated to improved functional mobility and activity tolerance.  She appears primarily limited at this time by (L) foot pain.  She is motivated to transition from PT for (B) knees to treating (L) foot in attempt to improve s/s and function.  Patient is to bring PT order from foot specialist next appointment.        Other - tentative plan to D/C skilled PT (B) knees and initiate PT for left foot.           Manual Therapy:         mins  22294;  Therapeutic Exercise:    16     mins  61213;     Neuromuscular Laine:        mins  28219;    Therapeutic Activity:     13     mins  42229;     Gait Training:           mins  04085;     Ultrasound:          mins  67252;    Electrical Stimulation:         mins  09060 ( );  Dry Needling          mins self-pay    Timed Treatment:   29   mins   Total Treatment:     48   mins    Jasmin Ladd PT, DPT  Physical Therapist  [place] License # [id number]    "

## 2017-11-28 ENCOUNTER — TREATMENT (OUTPATIENT)
Dept: PHYSICAL THERAPY | Facility: CLINIC | Age: 52
End: 2017-11-28

## 2017-11-28 DIAGNOSIS — M25.561 CHRONIC PAIN OF BOTH KNEES: ICD-10-CM

## 2017-11-28 DIAGNOSIS — M25.562 CHRONIC PAIN OF BOTH KNEES: ICD-10-CM

## 2017-11-28 DIAGNOSIS — M17.0 PRIMARY OSTEOARTHRITIS OF BOTH KNEES: Primary | ICD-10-CM

## 2017-11-28 DIAGNOSIS — G89.29 CHRONIC PAIN OF BOTH KNEES: ICD-10-CM

## 2017-11-28 PROCEDURE — 97032 APPL MODALITY 1+ESTIM EA 15: CPT | Performed by: PHYSICAL THERAPIST

## 2017-11-28 PROCEDURE — 97530 THERAPEUTIC ACTIVITIES: CPT | Performed by: PHYSICAL THERAPIST

## 2017-11-29 ENCOUNTER — TRANSCRIBE ORDERS (OUTPATIENT)
Dept: PHYSICAL THERAPY | Facility: CLINIC | Age: 52
End: 2017-11-29

## 2017-11-29 DIAGNOSIS — M76.822 TIBIALIS POSTERIOR TENDONITIS, LEFT: Primary | ICD-10-CM

## 2017-11-29 DIAGNOSIS — M76.72 PERONEAL TENDONITIS, LEFT: ICD-10-CM

## 2017-12-05 ENCOUNTER — TREATMENT (OUTPATIENT)
Dept: PHYSICAL THERAPY | Facility: CLINIC | Age: 52
End: 2017-12-05

## 2017-12-05 DIAGNOSIS — M76.822 POSTERIOR TIBIAL TENDONITIS, LEFT: ICD-10-CM

## 2017-12-05 DIAGNOSIS — M79.672 LEFT FOOT PAIN: Primary | ICD-10-CM

## 2017-12-05 DIAGNOSIS — M76.72 PERONEAL TENDONITIS, LEFT: ICD-10-CM

## 2017-12-05 PROCEDURE — 97530 THERAPEUTIC ACTIVITIES: CPT | Performed by: PHYSICAL THERAPIST

## 2017-12-05 PROCEDURE — 97161 PT EVAL LOW COMPLEX 20 MIN: CPT | Performed by: PHYSICAL THERAPIST

## 2017-12-05 PROCEDURE — 97110 THERAPEUTIC EXERCISES: CPT | Performed by: PHYSICAL THERAPIST

## 2017-12-05 PROCEDURE — 97014 ELECTRIC STIMULATION THERAPY: CPT | Performed by: PHYSICAL THERAPIST

## 2017-12-05 NOTE — PROGRESS NOTES
"Physical Therapy Initial Evaluation and Plan of Care    TIME IN 1028 TIME OUT 1143  Patient: Morena Contreras   : 1965  Diagnosis/ICD-10 Code:  Left foot pain [M79.672]  Referring practitioner: Jeanne Stanford DPM    Subjective Evaluation    History of Present Illness  Onset date: approx. 2 yrs ago.  Mechanism of injury: Gradual onset of (L) foot pain approx 2 years ago.  (L) lateral > medial ankle pain, posterior to malleoli. Has tried 1 round of PT without significant improvement, one injection with brief mild improvement, steroid pack, and anti-inflammatory medication.  Not currently taking any anti-inflammatory medication, states would not be ok by her heart doctor.     PMH: Open heart surgery for valve leak repair 17.  Was doing cardiac rehab in  2017 and developed a large (L) LE hematoma from taking blood thinners.    Currently doing cardiac rehab Monday, Wednesday, and Friday. Completed 11 visits for (B) knee pain and foot pain worsened through performance of heel raise exercise, also performed in cardiac rehab.    Reports cramping (L) foot and even into calf, especially at night (wakes at least once nightly).     Cannot receive injection from Dr. Elmore (knees) or Dr. Stanford (feet) due to being on blood thinners.      Patient Occupation:  - [employer]   Precautions and Work Restrictions: off work since heart surgery until released by cardiologistQuality of life: good    Pain  Current pain ratin  At best pain ratin  At worst pain ratin  Location: (L) lateral > medial ankle pain, posterior to malleoli.  Quality: cramping and sharp (\"just hurts\")  Aggravating factors: ambulation (riding LE bike (foot pain and cramps))  Progression: no change    Hand dominance: right    Diagnostic Tests  MRI studies: abnormal (B) feet, last year; showed tendonitis, per patient    Treatments  Previous treatment: physical therapy, medication and injection treatment  Current " "treatment: physical therapy  Patient Goals  Patient goals for therapy: decreased pain  Patient goal: \"stop it from hurting\"           Objective       Observations     Additional Observation Details  In neutral, NWB position, (L) > (R) foot exhibits increased pronation and decreased arch of foot.  Significant muscle wasting/atrophy is evident.    Tenderness   Left Ankle/Foot   Tenderness in the peroneal tendon and posterior tibial tendon.     Right Ankle/Foot   No tenderness.     Additional Tenderness Details  Mod (+) TTP (L) ankle at areas listed above.    Active Range of Motion   Left Ankle/Foot   Dorsiflexion (ke): 19 degrees   Plantar flexion: 36 degrees   Inversion: 36 degrees   Eversion: 30 degrees with pain    Right Ankle/Foot   Dorsiflexion (ke): 11 degrees   Plantar flexion: 40 degrees   Inversion: 37 degrees   Eversion: 35 degrees     Strength/Myotome Testing     Left Ankle/Foot   Dorsiflexion: 4+  Plantar flexion: 4  Inversion: 4-  Eversion: 3+    Right Ankle/Foot   Right ankle dorsiflexion strength: 5-/5.  Plantar flexion: 4+  Inversion: 4  Eversion: 4-    Swelling   Left Ankle/Foot   Figure 8 girth: 51.0.  Malleoli girth: 26.1.    Right Ankle/Foot   Figure 8 girth: 50.0.  Malleoli girth: 26.7.         Assessment & Plan     Assessment  Impairments: abnormal gait, abnormal muscle firing, abnormal or restricted ROM, activity intolerance, impaired balance, impaired physical strength, lacks appropriate home exercise program, pain with function and weight-bearing intolerance  Assessment details: Patient is a 52 y.o. female with a 2+ year history of (L) > (R) foot pain.  She has received one injection, taken anti-inflammatory medications including a steroid pack, and received one episode of physical therapy without significant improvement.  PMH significant for open heart surgery to repair valve leak 06/13/17.  She was undergoing cardiac rehab when she developed a large (L) LE hematoma from taking blood " thinners, and she just resumed her cardiac rehab 10/25/17 (is currently still taking part three days/wk).  She has had MRI of (B) feet and been diagnosed with posterior tibial and peroneal tendonitis.  She demonstrates significant muscle wasting (B) feet (notably of intrinsics), decreased (B) LE strength, impaired balance, poor tolerance to repetitive LE activities, pain with heel raises, and poor tolerance to functional mobility.  She will benefit from skilled PT to address these issues.  Prognosis: good  Functional Limitations: walking and unable to perform repetitive tasks  Goals  Plan Goals: STGs: to be met in 3 weeks  1. Patient will be independent with initial HEP  2. Patient will report improved (L) foot pain 0-4/10  3. Patient will tolerate 10 minutes on LE bike performing full revolutions without increased s/s  4. Patient will perform 2 x 10 (B) heel raises in seated position without increased s/s  5. Patient will demonstrate improved (L) ankle strength grossly 4+/5 all planes    LTGs: to be met in 6 weeks  1. Patient will be independent with progressed strength and balance HEP  2. Patient will report improved (L) ankle s/s 0-3/10  3. Patient will demonstrate improved (L) ankle strength grossly 5-/5 all planes  4. Patient will perform (B) heel raise in standing 2 x 10 reps without increased s/s  5. Patient will perform (L) LE SLS x 10 sec without LOB episode  6. Patient will report 50% improved tolerance to ambulation activities  7.       Plan  Therapy options: will be seen for skilled physical therapy services  Planned modality interventions: cryotherapy, electrical stimulation/Russian stimulation, iontophoresis, thermotherapy (hydrocollator packs) and ultrasound  Planned therapy interventions: balance/weight-bearing training, flexibility, functional ROM exercises, gait training, home exercise program, joint mobilization, manual therapy, neuromuscular re-education, soft tissue mobilization, strengthening,  stretching and therapeutic activities  Frequency: 2x week  Duration in weeks: 6  Treatment plan discussed with: patient  Plan details: Patient does not have scheduled follow-up appointment with her podiatrist.        Manual Therapy:         mins  99963;  Therapeutic Exercise:    19     mins  67771;     Neuromuscular Laine:        mins  11304;    Therapeutic Activity:     15     mins  82669;     Gait Training:           mins  77868;     Ultrasound:          mins  07187;    Electrical Stimulation:    15     mins  62140 ( );  Dry Needling          mins self-pay    Timed Treatment:   34   mins   Total Treatment:     75   mins    PT SIGNATURE: Jasmin Ladd PT, DPT   DATE TREATMENT INITIATED: 12/5/2017    Initial Certification  Certification Period: 3/5/2018  I certify that the therapy services are furnished while this patient is under my care.  The services outlined above are required by this patient, and will be reviewed every 90 days.     PHYSICIAN: Jeanne Stanford DPM      DATE:     Please sign and return via fax to [phone].. Thank you, [facility].

## 2017-12-07 ENCOUNTER — TREATMENT (OUTPATIENT)
Dept: PHYSICAL THERAPY | Facility: CLINIC | Age: 52
End: 2017-12-07

## 2017-12-07 DIAGNOSIS — M76.822 POSTERIOR TIBIAL TENDONITIS, LEFT: ICD-10-CM

## 2017-12-07 DIAGNOSIS — M76.72 PERONEAL TENDONITIS, LEFT: ICD-10-CM

## 2017-12-07 DIAGNOSIS — M79.672 LEFT FOOT PAIN: Primary | ICD-10-CM

## 2017-12-07 PROCEDURE — 97014 ELECTRIC STIMULATION THERAPY: CPT | Performed by: PHYSICAL THERAPIST

## 2017-12-07 PROCEDURE — 97530 THERAPEUTIC ACTIVITIES: CPT | Performed by: PHYSICAL THERAPIST

## 2017-12-07 PROCEDURE — 97110 THERAPEUTIC EXERCISES: CPT | Performed by: PHYSICAL THERAPIST

## 2017-12-07 NOTE — PROGRESS NOTES
"Physical Therapy Daily Progress Note    Time In 1040  Time Out 1136    Morena Contreras reports: \"My foot is bothering me today.  It might be from riding the bike for 20 minutes in cardiac rehab yesterday.\"    Subjective     Objective   See Exercise, Manual, and Modality Logs for complete treatment.   *Initiated LE bike retro  *Initiated seated heel raises, towel inversion/eversion, rockerboard, and gastroc/soleus stretches with belt    Assessment & Plan     Assessment  Assessment details: Patient is able to perform retro revolutions on LE bike x 10 min without increased foot pain.  She demonstrates good exercise recall this date, giving substantiating evidence to patient's reports of HEP compliance.  She performs heel raises in seated position without pain.  Significant muscle wasting/atrophy is evident (B) foot, especially of intrinsics muscles.        Progress strengthening /stabilization /functional activity as able.         Manual Therapy:         mins  44692;  Therapeutic Exercise:    22     mins  51326;     Neuromuscular Laine:        mins  04201;    Therapeutic Activity:     11     mins  17823;     Gait Training:           mins  75119;     Ultrasound:          mins  26091;    Electrical Stimulation:    15     mins  57542 ( );  Dry Needling          mins self-pay    Timed Treatment:   33   mins   Total Treatment:     56   mins    Jasmin Ladd PT, DPT  Physical Therapist  [place] License # [id number]    "

## 2017-12-14 ENCOUNTER — TREATMENT (OUTPATIENT)
Dept: PHYSICAL THERAPY | Facility: CLINIC | Age: 52
End: 2017-12-14

## 2017-12-14 DIAGNOSIS — M76.72 PERONEAL TENDONITIS, LEFT: ICD-10-CM

## 2017-12-14 DIAGNOSIS — M79.672 LEFT FOOT PAIN: Primary | ICD-10-CM

## 2017-12-14 DIAGNOSIS — M76.822 POSTERIOR TIBIAL TENDONITIS, LEFT: ICD-10-CM

## 2017-12-14 PROCEDURE — 97110 THERAPEUTIC EXERCISES: CPT | Performed by: PHYSICAL THERAPIST

## 2017-12-14 PROCEDURE — 97530 THERAPEUTIC ACTIVITIES: CPT | Performed by: PHYSICAL THERAPIST

## 2017-12-14 PROCEDURE — 97014 ELECTRIC STIMULATION THERAPY: CPT | Performed by: PHYSICAL THERAPIST

## 2017-12-14 NOTE — PROGRESS NOTES
"Physical Therapy Daily Progress Note    Time In 1019  Time Out 1128    Morena Contreras reports: \"My foot is killing me.  I was sore the next day after last PT session but I did the exercises at home too.\"     Subjective     Objective   See Exercise, Manual, and Modality Logs for complete treatment.       Assessment & Plan     Assessment  Assessment details: Patient reports increased s/s after most recent PT session, but concedes this may be partially compounded by cardiac rehab exercises as well.  However, for this reason no new therapeutic exercises were initiated this date in an attempt to discern any true provoking activities versus temporary increased s/s after initiating several new exercises last visit.  Patient ambulates with moderate antalgia (L) > (R) LE and increased trunk sway in frontal plane bilaterally.        Progress strengthening /stabilization /functional activity         Manual Therapy:         mins  78192;  Therapeutic Exercise:    22     mins  48910;     Neuromuscular Laine:        mins  63789;    Therapeutic Activity:     13     mins  93408;     Gait Training:           mins  84653;     Ultrasound:          mins  86772;    Electrical Stimulation:    15     mins  21792 ( );  Dry Needling          mins self-pay    Timed Treatment:   35   mins   Total Treatment:     69   mins    Jasmin Ladd PT, DPT  Physical Therapist  [place] License # [id number]    "

## 2017-12-19 ENCOUNTER — OFFICE VISIT (OUTPATIENT)
Dept: PHYSICAL THERAPY | Facility: CLINIC | Age: 52
End: 2017-12-19

## 2017-12-19 DIAGNOSIS — M76.822 POSTERIOR TIBIAL TENDONITIS, LEFT: ICD-10-CM

## 2017-12-19 DIAGNOSIS — M79.672 LEFT FOOT PAIN: Primary | ICD-10-CM

## 2017-12-19 DIAGNOSIS — M76.72 PERONEAL TENDONITIS, LEFT: ICD-10-CM

## 2017-12-19 PROCEDURE — 97014 ELECTRIC STIMULATION THERAPY: CPT | Performed by: PHYSICAL THERAPIST

## 2017-12-19 PROCEDURE — 97530 THERAPEUTIC ACTIVITIES: CPT | Performed by: PHYSICAL THERAPIST

## 2017-12-19 PROCEDURE — 97110 THERAPEUTIC EXERCISES: CPT | Performed by: PHYSICAL THERAPIST

## 2017-12-19 NOTE — PROGRESS NOTES
Physical Therapy Daily Progress Note    Time In 1025  Time Out 1135    Morena Contreras reports: foot has been hurting for awhile, but electricity and ice helping me.    Subjective     Objective   See Exercise, Manual, and Modality Logs for complete treatment.   Discussed potential application and benefit of phonophoresis/iontophoresis  Discussed importance of shoe wear and arch supports to decrease stress on       Assessment/Plan  Good performance of current HEP regimen with proper recall.  Minimal verbal cues to ensure proper technique with resistive inversion/eversion activities.    Progress strengthening /stabilization /functional activity as symptoms allow.  Consider addition of phonophoresis/iontophoresis to affected area left ankle           Manual Therapy:        mins  76511;  Therapeutic Exercise:     20   mins  28238;     Neuromuscular Liane:       mins  10700;    Therapeutic Activity:   15     mins  11695;     Gait Training:          mins  80926;     Ultrasound:          mins  85032;    Electrical Stimulation:   15     mins  28931 ( );      Timed Treatment:  50    mins   Total Treatment:    70    mins    Prabhjot Yu PTA    Physical Therapist Assistant [place] [id number]

## 2017-12-21 ENCOUNTER — TREATMENT (OUTPATIENT)
Dept: PHYSICAL THERAPY | Facility: CLINIC | Age: 52
End: 2017-12-21

## 2017-12-21 DIAGNOSIS — M76.822 POSTERIOR TIBIAL TENDONITIS, LEFT: ICD-10-CM

## 2017-12-21 DIAGNOSIS — M79.672 LEFT FOOT PAIN: Primary | ICD-10-CM

## 2017-12-21 DIAGNOSIS — M76.72 PERONEAL TENDONITIS, LEFT: ICD-10-CM

## 2017-12-21 PROCEDURE — 97530 THERAPEUTIC ACTIVITIES: CPT | Performed by: PHYSICAL THERAPIST

## 2017-12-21 PROCEDURE — 97110 THERAPEUTIC EXERCISES: CPT | Performed by: PHYSICAL THERAPIST

## 2017-12-21 PROCEDURE — 97014 ELECTRIC STIMULATION THERAPY: CPT | Performed by: PHYSICAL THERAPIST

## 2017-12-21 NOTE — PROGRESS NOTES
"Physical Therapy Daily Progress Note    Time In 0903  Time Out 1004    Morena Contreras reports: \"I'm really worn out from cardiac rehab.  My foot/ankle feels like it's getting a little stronger but I still have that pain.  Doing the bike in cardiac rehab kills me; it's not as bad if I go backward but still hurts.\"    Subjective     Objective   See Exercise, Manual, and Modality Logs for complete treatment.   *Initiated BAPS board activity lvl 2    Assessment & Plan     Assessment  Assessment details: (L) ankle/foot s/s persist to virtually the same degree, though patient verbalizes improved feeling of strength with PT exercises.  She is able to initiate BAPS board activity this date but struggles moreso in counterclockwise direction than clockwise.  Significant visible muscle wasting/atrophy persists as does lack of eccentric dorsiflexion control during ambulation.        Progress strengthening /stabilization /functional activity - progress to green theraband for 4-way resisted ankle.         Manual Therapy:         mins  84639;  Therapeutic Exercise:    19     mins  85002;     Neuromuscular Laine:        mins  91605;    Therapeutic Activity:     14     mins  45287;     Gait Training:           mins  53744;     Ultrasound:          mins  61438;    Electrical Stimulation:    15     mins  35748 ( );  Dry Needling          mins self-pay    Timed Treatment:   33   mins   Total Treatment:     61   mins    Jasmin Ladd PT, DPT  Physical Therapist  [place] License # [id number]    "

## 2017-12-26 ENCOUNTER — TREATMENT (OUTPATIENT)
Dept: PHYSICAL THERAPY | Facility: CLINIC | Age: 52
End: 2017-12-26

## 2017-12-26 DIAGNOSIS — M79.672 LEFT FOOT PAIN: Primary | ICD-10-CM

## 2017-12-26 DIAGNOSIS — M76.822 POSTERIOR TIBIAL TENDONITIS, LEFT: ICD-10-CM

## 2017-12-26 DIAGNOSIS — M76.72 PERONEAL TENDONITIS, LEFT: ICD-10-CM

## 2017-12-26 PROCEDURE — 97110 THERAPEUTIC EXERCISES: CPT | Performed by: PHYSICAL THERAPIST

## 2017-12-26 PROCEDURE — 97530 THERAPEUTIC ACTIVITIES: CPT | Performed by: PHYSICAL THERAPIST

## 2017-12-26 PROCEDURE — 97014 ELECTRIC STIMULATION THERAPY: CPT | Performed by: PHYSICAL THERAPIST

## 2017-12-26 NOTE — PROGRESS NOTES
"Physical Therapy Daily Progress Note    Time In 0859  Time Out 1011    Morena Contreras reports: \"My foot and ankle are actually feeling a little bit stronger.  I did a lot of walking over the weekend and by the time I got home I realized my foot wasn't hurting but my back was.  Normally it would have been bothering me before then.\"    Subjective     Objective   See Exercise, Manual, and Modality Logs for complete treatment.   *Increased resisted ankle 4-way resistance to green band    Assessment & Plan     Assessment  Assessment details: Patient reports improving (L) foot s/s and feeling of strength which appears to be translating to improving functional mobility tolerance, such as that demonstrated over the weekend.  (L) calf cramps persist, especially at night, however; discussed with patient potential sources/causes, both modifiable and not modifiable.  Significant weakness persists of (L) foot intrinsics and ankle musculature, though she is able to progress band resistance this date without significantly increased quickness to fatigue.        Progress strengthening /stabilization /functional activity as s/s allow.         Manual Therapy:         mins  65524;  Therapeutic Exercise:    22     mins  05992;     Neuromuscular Laine:        mins  54160;    Therapeutic Activity:     13     mins  29160;     Gait Training:           mins  78685;     Ultrasound:          mins  91436;    Electrical Stimulation:    15     mins  12680 ( );  Dry Needling          mins self-pay    Timed Treatment:   35   mins   Total Treatment:     72   mins    Jasmin Ladd PT, DPT  Physical Therapist  [place] License # [id number]    "

## 2017-12-27 ENCOUNTER — OFFICE VISIT (OUTPATIENT)
Dept: ORTHOPEDIC SURGERY | Facility: CLINIC | Age: 52
End: 2017-12-27

## 2017-12-27 VITALS — WEIGHT: 224.8 LBS | BODY MASS INDEX: 36.13 KG/M2 | HEIGHT: 66 IN | TEMPERATURE: 97.5 F

## 2017-12-27 DIAGNOSIS — M17.12 PRIMARY OSTEOARTHRITIS OF LEFT KNEE: ICD-10-CM

## 2017-12-27 DIAGNOSIS — M17.11 PRIMARY OSTEOARTHRITIS OF RIGHT KNEE: Primary | ICD-10-CM

## 2017-12-27 DIAGNOSIS — E66.01 SEVERE OBESITY (BMI 35.0-39.9): ICD-10-CM

## 2017-12-27 PROCEDURE — 99212 OFFICE O/P EST SF 10 MIN: CPT | Performed by: ORTHOPAEDIC SURGERY

## 2017-12-27 RX ORDER — HYDROXYZINE PAMOATE 25 MG/1
25 CAPSULE ORAL 3 TIMES DAILY PRN
COMMUNITY
End: 2020-05-26

## 2017-12-27 RX ORDER — SACUBITRIL AND VALSARTAN 24; 26 MG/1; MG/1
TABLET, FILM COATED ORAL
COMMUNITY
Start: 2017-12-13 | End: 2020-05-26

## 2017-12-27 RX ORDER — POTASSIUM CHLORIDE 750 MG/1
TABLET, FILM COATED, EXTENDED RELEASE ORAL
COMMUNITY
Start: 2017-11-13

## 2017-12-27 RX ORDER — FAMOTIDINE 20 MG/1
20 TABLET, FILM COATED ORAL 2 TIMES DAILY
COMMUNITY
End: 2020-05-26

## 2017-12-27 RX ORDER — NYSTATIN 100000 [USP'U]/G
POWDER TOPICAL 4 TIMES DAILY
COMMUNITY
End: 2020-05-26

## 2017-12-27 NOTE — PROGRESS NOTES
Patient:  Morena Contreras is a 52 y.o. female    Chief Complaint/ Reason for Visit:    Chief Complaint   Patient presents with   • Right Knee - Follow-up, Pain   • Left Knee - Follow-up, Pain       HPI:  Patient returns today for follow-up on bilateral knee pain secondary to osteoarthritis.  She says that she feels like the physical therapy has actually helped her discomfort.  She says she has been working, unsuccessfully, on weight loss.  According to our numbers, she has actually gained 4 pounds since she was last here 3 months ago.  Her pain is presently mild, aching in character, present to a fairly similar degree in both knees, and alleviated primarily by rest.  However, as noted above, the physical therapy has seemed to help.      PMH:    Past Medical History:   Diagnosis Date   • Anemia    • Asthma    • Cardiac disease     valve lean   • History of open heart surgery     06/13/17   • Irritable bowel syndrome    • Scoliosis    • Vertigo        PSH:    Past Surgical History:   Procedure Laterality Date   • CARDIAC SURGERY  06/2017   • CHOLECYSTECTOMY     • HYSTERECTOMY     • TUBAL ABDOMINAL LIGATION         Social Hx:    Social History     Social History   • Marital status: Other     Spouse name: N/A   • Number of children: N/A   • Years of education: N/A     Occupational History   • Not on file.     Social History Main Topics   • Smoking status: Never Smoker   • Smokeless tobacco: Never Used   • Alcohol use No   • Drug use: No   • Sexual activity: Defer     Other Topics Concern   • Not on file     Social History Narrative       Family Hx:    Family History   Problem Relation Age of Onset   • Heart disease Other      valve leak       Meds:    Current Outpatient Prescriptions:   •  carvedilol (COREG) 6.25 MG tablet, , Disp: , Rfl:   •  ENTRESTO 24-26 MG tablet, , Disp: , Rfl:   •  famotidine (PEPCID) 20 MG tablet, Take 20 mg by mouth 2 (Two) Times a Day., Disp: , Rfl:   •  FLOVENT DISKUS 250 MCG/BLIST aerosol  "powder , , Disp: , Rfl:   •  hydrOXYzine (VISTARIL) 25 MG capsule, Take 25 mg by mouth 3 (Three) Times a Day As Needed for Itching., Disp: , Rfl:   •  magnesium oxide (MAGOX) 400 (241.3 Mg) MG tablet tablet, Take 400 mg by mouth Daily., Disp: , Rfl:   •  nystatin (MYCOSTATIN) 810166 UNIT/GM powder, Apply  topically 4 (Four) Times a Day., Disp: , Rfl:   •  potassium chloride (K-DUR) 10 MEQ CR tablet, , Disp: , Rfl:   •  rivaroxaban (XARELTO) 20 MG tablet, Take 20 mg by mouth Daily., Disp: , Rfl:   •  SPIRIVA RESPIMAT 1.25 MCG/ACT aerosol solution inhaler, , Disp: , Rfl:   •  traMADol (ULTRAM) 50 MG tablet, , Disp: , Rfl:   •  vitamin D (ERGOCALCIFEROL) 31130 units capsule capsule, , Disp: , Rfl:   •  WELCHOL 625 MG tablet, , Disp: , Rfl:   •  budesonide-formoterol (SYMBICORT) 160-4.5 MCG/ACT inhaler, Inhale 2 puffs 2 (two) times a day., Disp: , Rfl:   •  meloxicam (MOBIC) 15 MG tablet, , Disp: , Rfl:   •  montelukast (SINGULAIR) 10 MG tablet, Take 10 mg by mouth every night., Disp: , Rfl:   •  Pantoprazole Sodium (PROTONIX PO), Take 40 mg by mouth., Disp: , Rfl:   •  VIBERZI 75 MG tablet, , Disp: , Rfl:   •  warfarin (COUMADIN) 2.5 MG tablet, , Disp: , Rfl:     Allergies:    Allergies   Allergen Reactions   • Procaine      Seizure like activity       ROS:  Review of Systems    Vitals:    12/27/17 1631   Temp: 97.5 °F (36.4 °C)   TempSrc: Temporal Artery    Weight: 102 kg (224 lb 12.8 oz)   Height: 167.6 cm (66\")     Body mass index is 36.28 kg/(m^2).    Physical Exam    The patient is awake, alert, and oriented ×3.  The patient is in no acute distress.  Breathing is regular and unlabored with a respiratory rate of 12/m.  Extraocular movements and pupillary responses are symmetrically intact. Sclerae are anicteric.   Hearing is within normal limits.  Speech is within normal limits.  There is no jugular venous distention.    She's walking with a lumbering gait but no pronounced antalgia.  She is able to move both of " her knees today and neither of them has any sign of infection.  Neurovascular exam is intact distally.        Assessment:     Diagnosis Plan   1. Primary osteoarthritis of right knee     2. Primary osteoarthritis of left knee     3. Severe obesity (BMI 35.0-39.9)             Plan:  I discussed everything with Mrs. Contreras.  I advised her that the primary goal that she needed to focus on was weight loss.  At her current BMI, she would not even be a candidate for knee replacement.  However, if she can get her BMI into a more healthy range, she will likely put off the need for surgery for a number of years.  She says she understands.    She can continue her physical therapy on an independent basis for the osteoarthritis in her knees.  I will see her back as needed.

## 2017-12-28 ENCOUNTER — TREATMENT (OUTPATIENT)
Dept: PHYSICAL THERAPY | Facility: CLINIC | Age: 52
End: 2017-12-28

## 2017-12-28 DIAGNOSIS — M76.822 POSTERIOR TIBIAL TENDONITIS, LEFT: ICD-10-CM

## 2017-12-28 DIAGNOSIS — M79.672 LEFT FOOT PAIN: Primary | ICD-10-CM

## 2017-12-28 DIAGNOSIS — M76.72 PERONEAL TENDONITIS, LEFT: ICD-10-CM

## 2017-12-28 PROBLEM — E66.01 SEVERE OBESITY (BMI 35.0-39.9): Status: ACTIVE | Noted: 2017-12-28

## 2017-12-28 PROCEDURE — 97530 THERAPEUTIC ACTIVITIES: CPT | Performed by: PHYSICAL THERAPIST

## 2017-12-28 PROCEDURE — 97110 THERAPEUTIC EXERCISES: CPT | Performed by: PHYSICAL THERAPIST

## 2017-12-28 PROCEDURE — 97014 ELECTRIC STIMULATION THERAPY: CPT | Performed by: PHYSICAL THERAPIST

## 2017-12-28 NOTE — PROGRESS NOTES
"Physical Therapy Daily Progress Note    Time In 0859  Time Out 1010    Morena Contreras reports: \"My foot is still feeling a little bit better - about 75% better since I started PT. I'm supposed to graduate cardiac rehab tomorrow. I may have to work on my PT exercises at home since my deductible will be starting over in the new year.\"    Subjective Evaluation    Pain  At best pain ratin  Pain scale at highest: 4-5/10.  Location: (L) ankle/foot, medial and lateral           Objective       Tenderness   Left Ankle/Foot   Tenderness in the peroneal tendon.     Additional Tenderness Details  Min (+) TTP along (L) peroneal tendon    Active Range of Motion   Left Ankle/Foot   Dorsiflexion (ke): 14 degrees   Plantar flexion: 49 degrees   Inversion: 28 degrees   Eversion: 18 degrees     Strength/Myotome Testing     Left Ankle/Foot   Dorsiflexion: 5  Left ankle plantar flexion strength: 5-/5.  Left ankle inversion strength: 5-/5.  Left ankle eversion strength: 5-/5.     See Exercise, Manual, and Modality Logs for complete treatment.   *Initiated standing [facility]    Assessment & Plan     Assessment  Assessment details: Patient has met all established PT STGs and LTGs #2,4, and 6; partially met LTG # 1; and not met LTG #4 and 5.  She reports >/= 75% improvement in (L) foot s/s and functional mobility tolerance since initiating PT services (7 visits).  She demonstrates (L) ankle/foot AROM and strength WFL, though moderate strength deficits persist in weightbearing/functional positions.  She expresses desire to discharge skilled PT services at this time (due to insurance reasons at the start of the new year) and verbalizes willingness to comply with HEP independently.  At this time she will be discharged from skilled PT services.        Other - discharge skilled PT services at this time to independent self-management with HEP.         Manual Therapy:         mins  02514;  Therapeutic Exercise:    21     mins  48743;   "   Neuromuscular Laine:        mins  31506;    Therapeutic Activity:     14     mins  84478;     Gait Training:           mins  14693;     Ultrasound:          mins  41911;    Electrical Stimulation:    15     mins  89634 ( );  Dry Needling          mins self-pay    Timed Treatment:   35   mins   Total Treatment:     71   mins    Jasmin Ladd PT, DPT  Physical Therapist  [place] License # [id number]

## 2019-03-06 ENCOUNTER — TRANSCRIBE ORDERS (OUTPATIENT)
Dept: ADMINISTRATIVE | Facility: HOSPITAL | Age: 54
End: 2019-03-06

## 2019-03-06 DIAGNOSIS — R19.8 OTH SYMPTOMS AND SIGNS INVOLVING THE DGSTV SYS AND ABDOMEN: Primary | ICD-10-CM

## 2019-03-06 DIAGNOSIS — R74.8 ABNORMAL LEVELS OF OTHER SERUM ENZYMES: ICD-10-CM

## 2019-03-15 ENCOUNTER — TRANSCRIBE ORDERS (OUTPATIENT)
Dept: ADMINISTRATIVE | Facility: HOSPITAL | Age: 54
End: 2019-03-15

## 2019-03-15 DIAGNOSIS — R19.8 OTHER SPECIFIED SYMPTOMS AND SIGNS INVOLVING THE DIGESTIVE SYSTEM AND ABDOMEN: Primary | ICD-10-CM

## 2019-03-15 DIAGNOSIS — R74.8 ABNORMAL LEVELS OF OTHER SERUM ENZYMES: ICD-10-CM

## 2019-03-16 ENCOUNTER — APPOINTMENT (OUTPATIENT)
Dept: CT IMAGING | Facility: HOSPITAL | Age: 54
End: 2019-03-16

## 2019-03-16 ENCOUNTER — HOSPITAL ENCOUNTER (OUTPATIENT)
Dept: CT IMAGING | Facility: HOSPITAL | Age: 54
Discharge: HOME | End: 2019-03-16
Admitting: NURSE PRACTITIONER

## 2019-03-16 DIAGNOSIS — R74.8 ABNORMAL LEVELS OF OTHER SERUM ENZYMES: ICD-10-CM

## 2019-03-16 DIAGNOSIS — R19.8 OTHER SPECIFIED SYMPTOMS AND SIGNS INVOLVING THE DIGESTIVE SYSTEM AND ABDOMEN: ICD-10-CM

## 2019-03-16 LAB — CREAT BLDA-MCNC: 0.9 MG/DL (ref 0.6–1.3)

## 2019-03-16 PROCEDURE — 25010000002 IOPAMIDOL 61 % SOLUTION: Performed by: NURSE PRACTITIONER

## 2019-03-16 PROCEDURE — 82565 ASSAY OF CREATININE: CPT

## 2019-03-16 PROCEDURE — 74160 CT ABDOMEN W/CONTRAST: CPT

## 2019-03-16 RX ADMIN — IOPAMIDOL 85 ML: 612 INJECTION, SOLUTION INTRAVENOUS at 08:27

## 2019-11-18 NOTE — PROGRESS NOTES
"Physical Therapy Daily Progress Note    Time In 1101  Time Out 1141    Morena Contreras reports: \"My knees are not bothering me too much, but I still get a lot of popping in my right knee and can tell my right knee is still weaker than my left.  I keep telling myself to use both legs when I do steps and that's when I can really tell the difference between the two.\"    Subjective     Objective   See Exercise, Manual, and Modality Logs for complete treatment.       Assessment & Plan     Assessment  Assessment details: Patient has met part of her established PT goals except for STG #2 and LTG #2, 5, and 6.  S/s are variable thus they do fluctuate.  In general, she reached a point where she is independent with a fairly progressed ROM and strengthening HEP for (B) knees.  She has been somewhat limited in her weightbearing activity progressions due to (L) > (R) foot pain.  Instructed patient this date in home TENS unit application/use as well as safety features.  She demonstrates independence and competence with its use.  At this time she will be discharged from skilled PT services for (B) knees.        Other - D/C skilled PT for (B) knee osteoarthritis to independent self-management with HEP.         Manual Therapy:         mins  83206;  Therapeutic Exercise:         mins  51266;     Neuromuscular Laine:        mins  34055;    Therapeutic Activity:     22     mins  25788;     Gait Training:           mins  76434;     Ultrasound:          mins  48166;    Electrical Stimulation:         mins  62518 ( );  Electrical Stim constant attend  __10__ 32807  Dry Needling          mins self-pay    Timed Treatment:   32   mins   Total Treatment:     40   mins    Jasmin Ladd, PT, DPT  Physical Therapist  [place] License # [id number]    " Dapsone Pregnancy And Lactation Text: This medication is Pregnancy Category C and is not considered safe during pregnancy or breast feeding.

## 2020-02-13 RX ORDER — COLESEVELAM HYDROCHLORIDE 625 MG/1
1875 TABLET, FILM COATED ORAL 2 TIMES DAILY WITH MEALS
Qty: 540 TABLET | Refills: 0 | Status: SHIPPED | OUTPATIENT
Start: 2020-02-13 | End: 2020-02-14 | Stop reason: SDUPTHER

## 2020-02-14 RX ORDER — COLESEVELAM HYDROCHLORIDE 625 MG/1
1875 TABLET, FILM COATED ORAL 2 TIMES DAILY WITH MEALS
Qty: 540 TABLET | Refills: 0 | Status: SHIPPED | OUTPATIENT
Start: 2020-02-14 | End: 2020-05-26

## 2020-05-05 RX ORDER — LANSOPRAZOLE 30 MG/1
30 CAPSULE, DELAYED RELEASE ORAL DAILY
Qty: 30 CAPSULE | Refills: 3 | Status: SHIPPED | OUTPATIENT
Start: 2020-05-05 | End: 2020-05-26

## 2020-05-05 NOTE — TELEPHONE ENCOUNTER
Received a refill request for this patient's Lansoprazole DR 30mg CAP SIG: take 1 capsule PO daily. Medication pended. Records in Box. Please refill if appropriate.         TS

## 2020-05-26 ENCOUNTER — OFFICE VISIT (OUTPATIENT)
Dept: GASTROENTEROLOGY | Facility: CLINIC | Age: 55
End: 2020-05-26

## 2020-05-26 VITALS
DIASTOLIC BLOOD PRESSURE: 80 MMHG | HEIGHT: 66 IN | WEIGHT: 214.2 LBS | BODY MASS INDEX: 34.42 KG/M2 | SYSTOLIC BLOOD PRESSURE: 118 MMHG | OXYGEN SATURATION: 99 % | HEART RATE: 62 BPM | RESPIRATION RATE: 16 BRPM | TEMPERATURE: 97.3 F

## 2020-05-26 DIAGNOSIS — K31.84 GASTROPARESIS: Primary | ICD-10-CM

## 2020-05-26 DIAGNOSIS — K58.0 IRRITABLE BOWEL SYNDROME WITH DIARRHEA: ICD-10-CM

## 2020-05-26 DIAGNOSIS — K21.9 GASTROESOPHAGEAL REFLUX DISEASE WITHOUT ESOPHAGITIS: ICD-10-CM

## 2020-05-26 PROCEDURE — 99214 OFFICE O/P EST MOD 30 MIN: CPT | Performed by: NURSE PRACTITIONER

## 2020-05-26 RX ORDER — COLESEVELAM HYDROCHLORIDE 3.75 G/1
POWDER, FOR SUSPENSION ORAL DAILY
COMMUNITY
End: 2020-11-20

## 2020-05-26 RX ORDER — COLESEVELAM 180 1/1
1875 TABLET ORAL 2 TIMES DAILY WITH MEALS
COMMUNITY
End: 2020-11-20

## 2020-05-26 RX ORDER — LANSOPRAZOLE 30 MG/1
30 CAPSULE, DELAYED RELEASE ORAL DAILY
COMMUNITY
End: 2020-09-02 | Stop reason: SDUPTHER

## 2020-05-26 NOTE — PROGRESS NOTES
Chief Complaint   Patient presents with   • Follow-up       HPI  Patient is a 54-year-old female who presents today for follow-up.    She was last seen in the office November 2019.  She has a history of IBS-D, GERD, hiatal hernia, duodenal ulcer, and gastroparesis.  Her most recent EGD was December 2019 with a 3 cm hiatal hernia, otherwise normal.  Duodenal biopsies were unremarkable, gastric biopsies showed mild chronic inactive gastritis and an ill-defined granuloma identified in the lamina propria.  Special stains were performed for fungus and acid-fast bacilli however the ill-defined granuloma was not present on deeper sectioning.  Her most recent colonoscopy was March 2018 with diverticulosis in the sigmoid, otherwise normal.    She had a CT March 2019 with a small sliding hiatal hernia, 2 enhancing lesions in the liver for which MRI was recommended, low density left adrenal nodule that likely represents a benign adenoma.  She had MRI of the abdomen April 2019 with no suspicious liver masses seen.    Patient presents today for follow-up.  She reports that she was hospitalized at [facility] around 1 month ago for pneumonia.  She reports she was started on a diuretic around this time and received multiple rounds of antibiotics.  She feels like the antibiotics have upset her GI issues.    Patient reports she has had some difficulty with bowel regularity.  She has a history of chronic diarrhea however reports her bowel movements have seemed slower since being on the antibiotics.  She continues on WelChol for chronic diarrhea but has had to decrease the frequency of her dosing.  She denies any blood in the stool or dark stool.  She has noted increased bloating and gassiness.    She denies abdominal pain.  Denies nausea or vomiting.    She continues on lansoprazole 30 mg daily for GERD.  She does feel as though this helps her symptoms however she notes at times she will have regurgitation when she lies down at night.   She reports occasional mild issues with dysphasia particularly to her WelChol pills but otherwise is eating and drinking with no difficulty.        Review of Systems   Constitutional: Positive for appetite change. Negative for chills, diaphoresis, fatigue, fever and unexpected weight change.   HENT: Positive for ear pain. Negative for dental problem, mouth sores, rhinorrhea, sore throat and voice change.    Eyes: Negative for pain, redness and visual disturbance.   Respiratory: Positive for cough. Negative for chest tightness and wheezing.    Cardiovascular: Negative for chest pain, palpitations and leg swelling.   Endocrine: Positive for heat intolerance. Negative for cold intolerance, polydipsia, polyphagia and polyuria.   Genitourinary: Negative for dysuria, frequency, hematuria and urgency.   Musculoskeletal: Positive for arthralgias and back pain. Negative for joint swelling, myalgias and neck pain.   Skin: Negative for rash.   Allergic/Immunologic: Positive for environmental allergies. Negative for food allergies and immunocompromised state.   Neurological: Positive for headaches. Negative for dizziness, seizures, weakness and numbness.   Hematological: Bruises/bleeds easily.   Psychiatric/Behavioral: Negative for sleep disturbance. The patient is not nervous/anxious.         Problem List:    Patient Active Problem List   Diagnosis   • Chronic pain of left knee   • Chronic pain of both knees   • Primary osteoarthritis of right knee   • Primary osteoarthritis of left knee   • Severe obesity (BMI 35.0-39.9)       Medical History:    Past Medical History:   Diagnosis Date   • Anemia    • Asthma    • Cardiac disease     valve lean   • History of open heart surgery     06/13/17   • Irritable bowel syndrome    • Scoliosis    • Vertigo         Social History:    Social History     Socioeconomic History   • Marital status:      Spouse name: Not on file   • Number of children: Not on file   • Years of education:  Not on file   • Highest education level: Not on file   Tobacco Use   • Smoking status: Never Smoker   • Smokeless tobacco: Never Used   Substance and Sexual Activity   • Alcohol use: No   • Drug use: No   • Sexual activity: Defer       Family History:   Family History   Problem Relation Age of Onset   • Heart disease Other         valve leak   • Colon cancer Neg Hx    • Colon polyps Neg Hx        Surgical History:   Past Surgical History:   Procedure Laterality Date   • CARDIAC SURGERY  06/2017   • CHOLECYSTECTOMY     • HYSTERECTOMY     • TUBAL ABDOMINAL LIGATION           Current Outpatient Medications:   •  budesonide-formoterol (SYMBICORT) 160-4.5 MCG/ACT inhaler, Inhale 2 puffs 2 (two) times a day., Disp: , Rfl:   •  carvedilol (COREG) 6.25 MG tablet, , Disp: , Rfl:   •  colesevelam (WELCHOL) 3.75 g pack pack, Take  by mouth Daily., Disp: , Rfl:   •  colesevelam (Welchol) 625 MG tablet, Take 1,875 mg by mouth 2 (Two) Times a Day With Meals., Disp: , Rfl:   •  lansoprazole (PREVACID) 30 MG capsule, Take 30 mg by mouth Daily., Disp: , Rfl:   •  SPIRIVA RESPIMAT 1.25 MCG/ACT aerosol solution inhaler, , Disp: , Rfl:   •  vitamin D (ERGOCALCIFEROL) 35919 units capsule capsule, , Disp: , Rfl:   •  warfarin (COUMADIN) 2.5 MG tablet, , Disp: , Rfl:   •  potassium chloride (K-DUR) 10 MEQ CR tablet, , Disp: , Rfl:   •  riFAXIMin (Xifaxan) 550 MG tablet, Take 1 tablet by mouth 3 (Three) Times a Day for 14 days., Disp: 42 tablet, Rfl: 0  •  rivaroxaban (XARELTO) 20 MG tablet, Take 20 mg by mouth Daily., Disp: , Rfl:     Allergies:  Procaine    The following portions of the patient's history were reviewed and updated as appropriate: allergies, current medications, past family history, past medical history, past social history, past surgical history and problem list.     Vitals:    05/26/20 1309   BP: 118/80   Pulse: 62   Resp: 16   Temp: 97.3 °F (36.3 °C)   SpO2: 99%         05/26/20  1309   Weight: 97.2 kg (214 lb 3.2 oz)      Body mass index is 34.57 kg/m².    Physical Exam   Constitutional: She is oriented to person, place, and time. She appears well-developed and well-nourished. No distress.   HENT:   Head: Normocephalic and atraumatic.   Eyes: No scleral icterus.   Cardiovascular: Normal rate and regular rhythm.   Pulmonary/Chest: Effort normal and breath sounds normal. No respiratory distress.   Abdominal: Soft. Bowel sounds are normal. She exhibits no distension. There is no tenderness. There is no guarding.   Neurological: She is alert and oriented to person, place, and time.   Skin: Skin is warm and dry.   Psychiatric: She has a normal mood and affect. Thought content normal.   Vitals reviewed.        Assessment/ Plan  Morena was seen today for follow-up.    Diagnoses and all orders for this visit:    Gastroparesis    Gastroesophageal reflux disease without esophagitis    Irritable bowel syndrome with diarrhea    Other orders  -     riFAXIMin (Xifaxan) 550 MG tablet; Take 1 tablet by mouth 3 (Three) Times a Day for 14 days.         Return in about 6 months (around 11/26/2020).    Patient Instructions   For IBS-D, complete course of Xifaxan as prescribed.    Recommend starting a daily probiotic.  Recommend Align or FLIP4NEW available over-the-counter.  Take 1 capsule daily with food.    For GERD, continue taking lansoprazole 30 mg daily as prescribed.    .For gastroparesis, follow a gastroparesis diet. Eat smaller more frequent meals as opposed to large meals and foods lower in fat and fiber.    Follow up in 6 months. Call for any new or worsening symptoms or failure to improve.         Discussion:  Discussed with patient today, suspect small distal bacterial overgrowth may have exacerbated her underlying irritable bowel syndrome with diarrhea following multiple rounds of antibiotics for pneumonia.  Will provide course of Xifaxan and encouraged her to read begin taking a daily probiotic.  Also reviewed dietary  modifications to help with gastroparesis at today's office visit.

## 2020-05-26 NOTE — PATIENT INSTRUCTIONS
For IBS-D, complete course of Xifaxan as prescribed.    Recommend starting a daily probiotic.  Recommend Align or [employer] available over-the-counter.  Take 1 capsule daily with food.    For GERD, continue taking lansoprazole 30 mg daily as prescribed.    .For gastroparesis, follow a gastroparesis diet. Eat smaller more frequent meals as opposed to large meals and foods lower in fat and fiber.    Follow up in 6 months. Call for any new or worsening symptoms or failure to improve.

## 2020-09-02 RX ORDER — LANSOPRAZOLE 30 MG/1
30 CAPSULE, DELAYED RELEASE ORAL DAILY
Qty: 30 CAPSULE | Refills: 5 | Status: SHIPPED | OUTPATIENT
Start: 2020-09-02 | End: 2020-11-20 | Stop reason: SDUPTHER

## 2020-11-20 ENCOUNTER — OFFICE VISIT (OUTPATIENT)
Dept: GASTROENTEROLOGY | Facility: CLINIC | Age: 55
End: 2020-11-20

## 2020-11-20 DIAGNOSIS — K21.9 GASTROESOPHAGEAL REFLUX DISEASE WITHOUT ESOPHAGITIS: ICD-10-CM

## 2020-11-20 DIAGNOSIS — K31.84 GASTROPARESIS: Primary | ICD-10-CM

## 2020-11-20 DIAGNOSIS — K58.0 IRRITABLE BOWEL SYNDROME WITH DIARRHEA: ICD-10-CM

## 2020-11-20 PROCEDURE — 99442 PR PHYS/QHP TELEPHONE EVALUATION 11-20 MIN: CPT | Performed by: NURSE PRACTITIONER

## 2020-11-20 RX ORDER — COLESEVELAM 180 1/1
1875 TABLET ORAL 2 TIMES DAILY WITH MEALS
Qty: 540 TABLET | Refills: 3 | Status: SHIPPED | OUTPATIENT
Start: 2020-11-20

## 2020-11-20 RX ORDER — SACUBITRIL AND VALSARTAN 24; 26 MG/1; MG/1
TABLET, FILM COATED ORAL
COMMUNITY
Start: 2020-11-18

## 2020-11-20 RX ORDER — FUROSEMIDE 40 MG/1
40 TABLET ORAL DAILY
COMMUNITY
Start: 2020-09-21

## 2020-11-20 RX ORDER — DAPAGLIFLOZIN 10 MG/1
TABLET, FILM COATED ORAL
COMMUNITY
Start: 2020-08-25

## 2020-11-20 RX ORDER — AMOXICILLIN AND CLAVULANATE POTASSIUM 875; 125 MG/1; MG/1
TABLET, FILM COATED ORAL
COMMUNITY
Start: 2020-11-19

## 2020-11-20 RX ORDER — LANSOPRAZOLE 30 MG/1
30 CAPSULE, DELAYED RELEASE ORAL DAILY
Qty: 90 CAPSULE | Refills: 3 | Status: SHIPPED | OUTPATIENT
Start: 2020-11-20

## 2020-11-20 NOTE — PROGRESS NOTES
Chief Complaint   Patient presents with   • Diarrhea       HPI  Patient is a 55-year-old female who presents today for follow-up.     She was last seen in the office November 2019.  She has a history of IBS-D, GERD, hiatal hernia, duodenal ulcer, and gastroparesis.     Prior workup has included EGD December 2019 with 3cm hiatal hernia, gastric biopsies with chronic inactive gastritis. Colonoscopy March 2018 with diverticulosis. CT March 2019 with small sliding hiatal hernia, 2 liver lesions for which MRI was recommended. MRI April 2019 with no suspicious liver masses seen.    Patient presents today for follow up. She was diagnosed with COVID19 infection around 1 month ago. During the time she had this, she had some increase in her GI symptoms with worsening nausea and diarrhea. Since the infection resolved, her symptoms improved and returned to baseline.    She reports currently she is doing well from a  GI standpoint.    She continues on lansoprazole for GERD. She reports good symptom control. Denies any dysphagia.    For gastroparesis, she continues to follow gastroparesis diet, eating smaller meals as opposed to large meals and feels symptoms are controlled. Reports occasional issues with bloating, but denies nausea or vomiting.    For diarrhea, she continues on welchol which she reports is working well to control her symptoms. She is generally having 3 bowel movements daily. Denies rectal bleeding. Denies abdominal pain.    She is currently on Augmentin for sinus infection. She is taking probiotics with this. She reports there are plans for sinus surgery, however this had to be delayed due to COVID19 infection.    You have chosen to receive care through a telephone visit today. Do you consent to use a telephone visit for your medical care today? Yes      Review of Systems   Constitutional: Negative for appetite change and fever.   HENT: Positive for sinus pressure and sinus pain.    Respiratory: Negative for  cough.    Gastrointestinal: Negative for abdominal pain and blood in stool.       Problem List:    Patient Active Problem List   Diagnosis   • Chronic pain of left knee   • Chronic pain of both knees   • Primary osteoarthritis of right knee   • Primary osteoarthritis of left knee   • Severe obesity (BMI 35.0-39.9)       Medical History:    Past Medical History:   Diagnosis Date   • Anemia    • Asthma    • Cardiac disease     valve lean   • History of open heart surgery     06/13/17   • Irritable bowel syndrome    • Scoliosis    • Vertigo         Social History:    Social History     Socioeconomic History   • Marital status:      Spouse name: Not on file   • Number of children: Not on file   • Years of education: Not on file   • Highest education level: Not on file   Tobacco Use   • Smoking status: Never Smoker   • Smokeless tobacco: Never Used   Substance and Sexual Activity   • Alcohol use: No   • Drug use: No   • Sexual activity: Defer       Family History:   Family History   Problem Relation Age of Onset   • Heart disease Other         valve leak   • Colon cancer Neg Hx    • Colon polyps Neg Hx        Surgical History:   Past Surgical History:   Procedure Laterality Date   • CARDIAC SURGERY  06/2017   • CHOLECYSTECTOMY     • HYSTERECTOMY     • TUBAL ABDOMINAL LIGATION           Current Outpatient Medications:   •  budesonide-formoterol (SYMBICORT) 160-4.5 MCG/ACT inhaler, Inhale 2 puffs 2 (two) times a day., Disp: , Rfl:   •  carvedilol (COREG) 6.25 MG tablet, , Disp: , Rfl:   •  colesevelam (Welchol) 625 MG tablet, Take 3 tablets by mouth 2 (Two) Times a Day With Meals., Disp: 540 tablet, Rfl: 3  •  lansoprazole (PREVACID) 30 MG capsule, Take 1 capsule by mouth Daily., Disp: 90 capsule, Rfl: 3  •  rivaroxaban (XARELTO) 20 MG tablet, Take 20 mg by mouth Daily., Disp: , Rfl:   •  SPIRIVA RESPIMAT 1.25 MCG/ACT aerosol solution inhaler, , Disp: , Rfl:   •  vitamin D (ERGOCALCIFEROL) 09822 units capsule  capsule, , Disp: , Rfl:   •  amoxicillin-clavulanate (AUGMENTIN) 875-125 MG per tablet, , Disp: , Rfl:   •  Entresto 24-26 MG tablet, , Disp: , Rfl:   •  Farxiga 10 MG tablet, TAKE 1 TABLET BY MOUTH ONCE DAILY IN THE MORNING BEFORE A MEAL, Disp: , Rfl:   •  furosemide (LASIX) 40 MG tablet, Take 40 mg by mouth Daily., Disp: , Rfl:   •  potassium chloride (K-DUR) 10 MEQ CR tablet, , Disp: , Rfl:     Allergies:  Procaine    The following portions of the patient's history were reviewed and updated as appropriate: allergies, current medications, past family history, past medical history, past social history, past surgical history and problem list.    Assessment/ Plan  Diagnoses and all orders for this visit:    1. Gastroparesis (Primary)    2. Gastroesophageal reflux disease without esophagitis    3. Irritable bowel syndrome with diarrhea    Other orders  -     lansoprazole (PREVACID) 30 MG capsule; Take 1 capsule by mouth Daily.  Dispense: 90 capsule; Refill: 3  -     colesevelam (Welchol) 625 MG tablet; Take 3 tablets by mouth 2 (Two) Times a Day With Meals.  Dispense: 540 tablet; Refill: 3         Return in about 1 year (around 11/20/2021).    Patient Instructions   For GERD, continue lansoprazole daily as prescribed. Refill provided.    For gastroparesis, follow a gastroparesis diet. Eat smaller more frequent meals as opposed to large meals and foods lower in fat and fiber.    For diarrhea, continue Welchol as prescribed. Refill provided.    Colon cancer screening up to date, due for next colonoscopy March 2028.    Follow up yearly and as needed. Call for any new or worsening symptoms.      Discussion:  Symptomatically, patient is doing well at this time. She had some exacerbation of symptoms during COVID19 infection that have since resolved. We will continue current treatment. Dietary modifications to help with gastroparesis were reinforced during today's visit.    This visit was completed as a telephone visit due  to COVID-19 pandemic. This visit has been rescheduled as a phone visit to comply with patient safety concerns in accordance with CDC recommendations. Total time of discussion was 14 minutes.

## 2020-11-20 NOTE — PATIENT INSTRUCTIONS
For GERD, continue lansoprazole daily as prescribed. Refill provided.    For gastroparesis, follow a gastroparesis diet. Eat smaller more frequent meals as opposed to large meals and foods lower in fat and fiber.    For diarrhea, continue Welchol as prescribed. Refill provided.    Colon cancer screening up to date, due for next colonoscopy March 2028.    Follow up yearly and as needed. Call for any new or worsening symptoms.

## 2021-03-26 ENCOUNTER — BULK ORDERING (OUTPATIENT)
Dept: CASE MANAGEMENT | Facility: OTHER | Age: 56
End: 2021-03-26

## 2021-03-26 DIAGNOSIS — Z23 IMMUNIZATION DUE: ICD-10-CM
